# Patient Record
Sex: MALE | Race: WHITE | Employment: FULL TIME | URBAN - METROPOLITAN AREA
[De-identification: names, ages, dates, MRNs, and addresses within clinical notes are randomized per-mention and may not be internally consistent; named-entity substitution may affect disease eponyms.]

---

## 2023-05-20 ENCOUNTER — HOSPITAL ENCOUNTER (EMERGENCY)
Age: 28
Discharge: HOME | End: 2023-05-20
Attending: GENERAL PRACTICE

## 2023-05-20 VITALS
RESPIRATION RATE: 19 BRPM | BODY MASS INDEX: 24.34 KG/M2 | DIASTOLIC BLOOD PRESSURE: 68 MMHG | HEART RATE: 83 BPM | OXYGEN SATURATION: 97 % | HEIGHT: 70 IN | SYSTOLIC BLOOD PRESSURE: 138 MMHG | WEIGHT: 170 LBS | TEMPERATURE: 98.8 F

## 2023-05-20 DIAGNOSIS — R11.2 NAUSEA VOMITING AND DIARRHEA: ICD-10-CM

## 2023-05-20 DIAGNOSIS — R19.7 NAUSEA VOMITING AND DIARRHEA: ICD-10-CM

## 2023-05-20 DIAGNOSIS — E86.0 DEHYDRATION: Primary | ICD-10-CM

## 2023-05-20 PROCEDURE — 99282 EMERGENCY DEPT VISIT SF MDM: CPT

## 2023-05-20 ASSESSMENT — ENCOUNTER SYMPTOMS
BACK PAIN: 0
VOMITING: 1
ABDOMINAL PAIN: 0
NAUSEA: 1
DIARRHEA: 1
EYES NEGATIVE: 1
SHORTNESS OF BREATH: 0

## 2023-05-20 ASSESSMENT — LIFESTYLE VARIABLES
HOW OFTEN DO YOU HAVE A DRINK CONTAINING ALCOHOL: MONTHLY OR LESS
HOW MANY STANDARD DRINKS CONTAINING ALCOHOL DO YOU HAVE ON A TYPICAL DAY: 1 OR 2

## 2023-05-20 ASSESSMENT — PAIN - FUNCTIONAL ASSESSMENT: PAIN_FUNCTIONAL_ASSESSMENT: NONE - DENIES PAIN

## 2023-05-20 NOTE — ED NOTES
Pt c/o food poison symptoms, yesterday, states feels better now and need MD note     Renato Kellogg RN  05/20/23 1935

## 2024-09-05 ENCOUNTER — HOSPITAL ENCOUNTER (EMERGENCY)
Age: 29
Discharge: HOME | End: 2024-09-05
Payer: COMMERCIAL

## 2024-09-05 VITALS
HEART RATE: 63 BPM | RESPIRATION RATE: 16 BRPM | SYSTOLIC BLOOD PRESSURE: 144 MMHG | DIASTOLIC BLOOD PRESSURE: 81 MMHG | TEMPERATURE: 99.32 F | OXYGEN SATURATION: 99 %

## 2024-09-05 DIAGNOSIS — F12.90: ICD-10-CM

## 2024-09-05 DIAGNOSIS — U07.1: Primary | ICD-10-CM

## 2024-09-05 PROCEDURE — 99203 OFFICE O/P NEW LOW 30 MIN: CPT

## 2024-09-05 PROCEDURE — G0463 HOSPITAL OUTPT CLINIC VISIT: HCPCS

## 2024-09-05 PROCEDURE — 87804 INFLUENZA ASSAY W/OPTIC: CPT

## 2024-09-05 PROCEDURE — 87426 SARSCOV CORONAVIRUS AG IA: CPT

## 2024-09-05 NOTE — ED.URI
HPI - URI/Sore Throat
General
Chief Complaint: Upper Respiratory Infection
Stated Complaint: head congestion/ body aches
Source: patient and RN notes reviewed
Mode of arrival: ambulatory
Limitations: no limitations
History of Present Illness
HPI Narrative: 
28-year-old male presented for complaint of headache, body aches, sinus pressure/congestion, cough. Onset 2 days. Taking Tylenol Sinus. Endorses exposure to covid. Denies sob, wheezing, n/v/d/f/c. 
MD elicited complaint: cough
Related Data
Home Medications

 Medication  Instructions  Recorded  Confirmed
No Home Medications  09/05/24 09/05/24


Allergies

Allergy/AdvReac Type Severity Reaction Status Date / Time
valacyclovir Allergy  Unknown Verified 09/05/24 17:08



Review of Systems
Review of Systems:   
CONSTITUTIONAL: Endorses malaise, denies chills, sweats,  fever
EYES: Denies visual changes, redness, or discharge
ENT: Reports rhinorrhea, congestion, denies sinus pain, otalgia, sore throat
CARDIOVASCULAR: Denies chest pain, palpitations, edema
RESPIRATORY: Reports cough, post nasal drainage.  Denies dyspnea
GASTROINTESTINAL: Denies abdominal pain, nausea, vomiting, diarrhea
SKIN: Denies rash or itching
MUSCULOSKELETAL: Endorses myalgia
NEUROLOGIC: endorses headache

 

PMFSH
Surgical History
Surgical History (Updated 09/05/24 @ 17:14 by LORETTA Washington)

History of tonsillectomy


Social History
Social History (Updated 09/05/24 @ 17:14 by LORETTA Washington)
Substance use:  current 
Substance use type:  marijuana 



Exam
Narrative:   
GENERAL: well-appearing
EYES: PERRLA, conjunctivae clear
ENT: Mucous membranes moist. TMs pearly gray with dull light reflex bilaterally; no tragal tenderness. 
Oropharynx not erythematous without lesions or exudate, tonsils absent no drooling, no hoarseness, no trismus, uvula midline. 
No tripod positioning, muffled voice, soft palate or pharyngeal wall bulging
NECK: Supple. No lymphadenopathy
CHEST: Clear to auscultation, breath sounds equal. No wheezing, rhonchi, rales, or stridor. No respiratory distress, speaks in full sentences.
HEART: Regular rate and rhythm. No murmur heard. 
SKIN: Warm, dry, no rash.
NEURO: Alert and oriented x3. 
PSYCH: Normal mood and affect

 

Course
Course
Emergency Course: 
Patient is aware of diagnosis, understands and agrees to treatment plan.  Anticipatory guidance given.  Patient agrees to follow-up as directed and is aware of reasons to seek care at the emergency department.
Portions of this record may have been created with voice recognition software

Level of Care: Express Care Visit
Vital Signs
Vital signs: 

Vital Signs

Temperature  99.4 F   09/05/24 17:01
Pulse Rate  63   09/05/24 17:01
Respiratory Rate  16   09/05/24 17:01
Blood Pressure  144/81 H  09/05/24 17:01
Pulse Oximetry  99   09/05/24 17:01
Oxygen Delivery  Room Air   09/05/24 17:01



Temperature  99.4 F   09/05/24 17:01
Pulse Rate  63   09/05/24 17:01
Respiratory Rate  16   09/05/24 17:01
Blood Pressure  144/81 H  09/05/24 17:01
Pulse Oximetry  99   09/05/24 17:01
Oxygen Delivery  Room Air   09/05/24 17:01


reviewed

MDM - URI/Sore Throat
MDM Narrative
Medical decision making narrative: 
Discussed physical exam findings. Advised supportive measures and signs/symptoms to go to the ER. Pt is appropriate for outpt treatment and f/u.
Differential Diagnosis
Differential diagnosis: Likely upper respiratory infection, sinusitis and viral infection
Lab Data
Labs: 

Lab Results

  09/05/24 Range/Units
  17:01 
POC Influenza A Ag  Negative  
POC Influenza B Ag  Negative  




Discharge Plan
Discharge
Clinical Impression:
 COVID-19


Patient Disposition: Home, Self-Care

Condition: Stable

Instructions:  COVID-19 (Coronavirus Disease 2019) (ED)

Additional Instructions:
Your rapid COVID test was positive today.
The following updated recommendations have been made by the CDC and local Health Departments, re

## 2024-09-05 NOTE — ED_ITS
HPI - URI/Sore Throat    
General    
Chief Complaint: Upper Respiratory Infection    
Stated Complaint: head congestion/ body aches    
Source: patient and RN notes reviewed    
Mode of arrival: ambulatory    
Limitations: no limitations    
History of Present Illness    
HPI Narrative:     
28-year-old male presented for complaint of headache, body aches, sinus   
pressure/congestion, cough. Onset 2 days. Taking Tylenol Sinus. Endorses   
exposure to covid. Denies sob, wheezing, n/v/d/f/c.     
MD elicited complaint: cough    
Related Data    
                                Home Medications    
    
    
    
 Medication  Instructions  Recorded  Confirmed    
     
No Home Medications  09/05/24 09/05/24    
    
    
    
                                    Allergies    
    
    
    
Allergy/AdvReac Type Severity Reaction Status Date / Time    
     
valacyclovir Allergy  Unknown Verified 09/05/24 17:08    
    
    
    
    
Review of Systems    
    
    
Review of Systems:       
CONSTITUTIONAL: Endorses malaise, denies chills, sweats,  fever    
EYES: Denies visual changes, redness, or discharge    
ENT: Reports rhinorrhea, congestion, denies sinus pain, otalgia, sore throat    
CARDIOVASCULAR: Denies chest pain, palpitations, edema    
RESPIRATORY: Reports cough, post nasal drainage.  Denies dyspnea    
GASTROINTESTINAL: Denies abdominal pain, nausea, vomiting, diarrhea    
SKIN: Denies rash or itching    
MUSCULOSKELETAL: Endorses myalgia    
NEUROLOGIC: endorses headache    
    
       
    
    
PMFSH    
Surgical History    
Surgical History (Updated 09/05/24 @ 17:14 by LORETTA Washington)    
    
History of tonsillectomy    
    
    
Social History    
Social History (Updated 09/05/24 @ 17:14 by LORETTA Washington)    
Substance use:  current     
Substance use type:  marijuana     
    
    
    
Exam    
    
    
Narrative:       
GENERAL: well-appearing    
EYES: PERRLA, conjunctivae clear    
ENT: Mucous membranes moist. TMs pearly gray with dull light reflex bilaterally;  
no tragal tenderness.     
Oropharynx not erythematous without lesions or exudate, tonsils absent no   
drooling, no hoarseness, no trismus, uvula midline.     
No tripod positioning, muffled voice, soft palate or pharyngeal wall bulging    
NECK: Supple. No lymphadenopathy    
CHEST: Clear to auscultation, breath sounds equal. No wheezing, rhonchi, rales,   
or stridor. No respiratory distress, speaks in full sentences.    
HEART: Regular rate and rhythm. No murmur heard.     
SKIN: Warm, dry, no rash.    
NEURO: Alert and oriented x3.     
PSYCH: Normal mood and affect    
    
       
    
    
Course    
Course    
Emergency Course:     
Patient is aware of diagnosis, understands and agrees to treatment plan.    
Anticipatory guidance given.  Patient agrees to follow-up as directed and is   
aware of reasons to seek care at the emergency department.    
Portions of this record may have been created with voice recognition software    
    
Level of Care: Express Care Visit    
Vital Signs    
Vital signs:     
    
                                   Vital Signs    
    
    
    
Temperature  99.4 F   09/05/24 17:01    
     
Pulse Rate  63   09/05/24 17:01    
     
Respiratory Rate  16   09/05/24 17:01    
     
Blood Pressure  144/81 H  09/05/24 17:01    
     
Pulse Oximetry  99   09/05/24 17:01    
     
Oxygen Delivery  Room Air   09/05/24 17:01    
    
    
                                            
    
    
    
Temperature  99.4 F   09/05/24 17:01    
     
Pulse Rate  63   09/05/24 17:01    
     
Respiratory Rate  16   09/05/24 17:01    
     
Blood Pressure  144/81 H  09/05/24 17:01    
     
Pulse Oximetry  99   09/05/24 17:01    
     
Oxygen Delivery  Room Air   09/05/24 17:01    
    
    
    
reviewed    
    
MDM - URI/Sore Throat    
MDM Narrative    
Medical decisi